# Patient Record
Sex: MALE | Race: OTHER | NOT HISPANIC OR LATINO | URBAN - METROPOLITAN AREA
[De-identification: names, ages, dates, MRNs, and addresses within clinical notes are randomized per-mention and may not be internally consistent; named-entity substitution may affect disease eponyms.]

---

## 2020-09-03 RX ORDER — INFLUENZA VIRUS VACCINE 15; 15; 15; 15 UG/.5ML; UG/.5ML; UG/.5ML; UG/.5ML
0.5 SUSPENSION INTRAMUSCULAR ONCE
Refills: 0 | Status: COMPLETED | OUTPATIENT
Start: 2020-09-04 | End: 2020-09-04

## 2020-09-04 ENCOUNTER — INPATIENT (INPATIENT)
Facility: HOSPITAL | Age: 50
LOS: 4 days | Discharge: HOME HEALTH | DRG: 30 | End: 2020-09-09
Attending: NEUROLOGICAL SURGERY | Admitting: NEUROLOGICAL SURGERY
Payer: COMMERCIAL

## 2020-09-04 VITALS
HEART RATE: 96 BPM | RESPIRATION RATE: 16 BRPM | SYSTOLIC BLOOD PRESSURE: 142 MMHG | TEMPERATURE: 97 F | WEIGHT: 174.17 LBS | DIASTOLIC BLOOD PRESSURE: 88 MMHG

## 2020-09-04 LAB
ANION GAP SERPL CALC-SCNC: 11 MMOL/L — SIGNIFICANT CHANGE UP (ref 5–17)
APTT BLD: 31.8 SEC — SIGNIFICANT CHANGE UP (ref 27.5–35.5)
BASOPHILS # BLD AUTO: 0.06 K/UL — SIGNIFICANT CHANGE UP (ref 0–0.2)
BASOPHILS NFR BLD AUTO: 0.9 % — SIGNIFICANT CHANGE UP (ref 0–2)
BUN SERPL-MCNC: 12 MG/DL — SIGNIFICANT CHANGE UP (ref 7–23)
CALCIUM SERPL-MCNC: 9.2 MG/DL — SIGNIFICANT CHANGE UP (ref 8.4–10.5)
CHLORIDE SERPL-SCNC: 104 MMOL/L — SIGNIFICANT CHANGE UP (ref 96–108)
CO2 SERPL-SCNC: 25 MMOL/L — SIGNIFICANT CHANGE UP (ref 22–31)
CREAT SERPL-MCNC: 0.66 MG/DL — SIGNIFICANT CHANGE UP (ref 0.5–1.3)
EOSINOPHIL # BLD AUTO: 0 K/UL — SIGNIFICANT CHANGE UP (ref 0–0.5)
EOSINOPHIL NFR BLD AUTO: 0 % — SIGNIFICANT CHANGE UP (ref 0–6)
GLUCOSE SERPL-MCNC: 158 MG/DL — HIGH (ref 70–99)
HCT VFR BLD CALC: 43.1 % — SIGNIFICANT CHANGE UP (ref 39–50)
HGB BLD-MCNC: 15 G/DL — SIGNIFICANT CHANGE UP (ref 13–17)
INR BLD: 1.07 — SIGNIFICANT CHANGE UP (ref 0.88–1.16)
LYMPHOCYTES # BLD AUTO: 0.37 K/UL — LOW (ref 1–3.3)
LYMPHOCYTES # BLD AUTO: 5.2 % — LOW (ref 13–44)
MCHC RBC-ENTMCNC: 28.8 PG — SIGNIFICANT CHANGE UP (ref 27–34)
MCHC RBC-ENTMCNC: 34.8 GM/DL — SIGNIFICANT CHANGE UP (ref 32–36)
MCV RBC AUTO: 82.7 FL — SIGNIFICANT CHANGE UP (ref 80–100)
MONOCYTES # BLD AUTO: 0.12 K/UL — SIGNIFICANT CHANGE UP (ref 0–0.9)
MONOCYTES NFR BLD AUTO: 1.7 % — LOW (ref 2–14)
NEUTROPHILS # BLD AUTO: 6.45 K/UL — SIGNIFICANT CHANGE UP (ref 1.8–7.4)
NEUTROPHILS NFR BLD AUTO: 91.3 % — HIGH (ref 43–77)
PLATELET # BLD AUTO: 267 K/UL — SIGNIFICANT CHANGE UP (ref 150–400)
POTASSIUM SERPL-MCNC: 3.8 MMOL/L — SIGNIFICANT CHANGE UP (ref 3.5–5.3)
POTASSIUM SERPL-SCNC: 3.8 MMOL/L — SIGNIFICANT CHANGE UP (ref 3.5–5.3)
PROTHROM AB SERPL-ACNC: 12.8 SEC — SIGNIFICANT CHANGE UP (ref 10.6–13.6)
RBC # BLD: 5.21 M/UL — SIGNIFICANT CHANGE UP (ref 4.2–5.8)
RBC # FLD: 12.6 % — SIGNIFICANT CHANGE UP (ref 10.3–14.5)
SODIUM SERPL-SCNC: 140 MMOL/L — SIGNIFICANT CHANGE UP (ref 135–145)
WBC # BLD: 7.07 K/UL — SIGNIFICANT CHANGE UP (ref 3.8–10.5)
WBC # FLD AUTO: 7.07 K/UL — SIGNIFICANT CHANGE UP (ref 3.8–10.5)

## 2020-09-04 PROCEDURE — 88304 TISSUE EXAM BY PATHOLOGIST: CPT | Mod: 26

## 2020-09-04 PROCEDURE — 88305 TISSUE EXAM BY PATHOLOGIST: CPT | Mod: 26

## 2020-09-04 RX ORDER — CHLORHEXIDINE GLUCONATE 213 G/1000ML
1 SOLUTION TOPICAL EVERY 12 HOURS
Refills: 0 | Status: DISCONTINUED | OUTPATIENT
Start: 2020-09-04 | End: 2020-09-04

## 2020-09-04 RX ORDER — DEXTROSE 50 % IN WATER 50 %
12.5 SYRINGE (ML) INTRAVENOUS ONCE
Refills: 0 | Status: DISCONTINUED | OUTPATIENT
Start: 2020-09-04 | End: 2020-09-09

## 2020-09-04 RX ORDER — HYDROMORPHONE HYDROCHLORIDE 2 MG/ML
0.5 INJECTION INTRAMUSCULAR; INTRAVENOUS; SUBCUTANEOUS
Refills: 0 | Status: DISCONTINUED | OUTPATIENT
Start: 2020-09-04 | End: 2020-09-07

## 2020-09-04 RX ORDER — OXYCODONE AND ACETAMINOPHEN 5; 325 MG/1; MG/1
2 TABLET ORAL EVERY 4 HOURS
Refills: 0 | Status: DISCONTINUED | OUTPATIENT
Start: 2020-09-04 | End: 2020-09-09

## 2020-09-04 RX ORDER — DEXTROSE 50 % IN WATER 50 %
25 SYRINGE (ML) INTRAVENOUS ONCE
Refills: 0 | Status: DISCONTINUED | OUTPATIENT
Start: 2020-09-04 | End: 2020-09-09

## 2020-09-04 RX ORDER — INSULIN LISPRO 100/ML
VIAL (ML) SUBCUTANEOUS
Refills: 0 | Status: DISCONTINUED | OUTPATIENT
Start: 2020-09-04 | End: 2020-09-05

## 2020-09-04 RX ORDER — OXYCODONE AND ACETAMINOPHEN 5; 325 MG/1; MG/1
1 TABLET ORAL EVERY 4 HOURS
Refills: 0 | Status: DISCONTINUED | OUTPATIENT
Start: 2020-09-04 | End: 2020-09-09

## 2020-09-04 RX ORDER — ONDANSETRON 8 MG/1
4 TABLET, FILM COATED ORAL EVERY 6 HOURS
Refills: 0 | Status: DISCONTINUED | OUTPATIENT
Start: 2020-09-04 | End: 2020-09-07

## 2020-09-04 RX ORDER — CEFAZOLIN SODIUM 1 G
2000 VIAL (EA) INJECTION EVERY 8 HOURS
Refills: 0 | Status: COMPLETED | OUTPATIENT
Start: 2020-09-04 | End: 2020-09-05

## 2020-09-04 RX ORDER — CYCLOBENZAPRINE HYDROCHLORIDE 10 MG/1
10 TABLET, FILM COATED ORAL EVERY 8 HOURS
Refills: 0 | Status: DISCONTINUED | OUTPATIENT
Start: 2020-09-04 | End: 2020-09-09

## 2020-09-04 RX ORDER — MAGNESIUM HYDROXIDE 400 MG/1
30 TABLET, CHEWABLE ORAL EVERY 12 HOURS
Refills: 0 | Status: DISCONTINUED | OUTPATIENT
Start: 2020-09-04 | End: 2020-09-09

## 2020-09-04 RX ORDER — SODIUM CHLORIDE 9 MG/ML
1000 INJECTION, SOLUTION INTRAVENOUS
Refills: 0 | Status: DISCONTINUED | OUTPATIENT
Start: 2020-09-04 | End: 2020-09-09

## 2020-09-04 RX ORDER — SODIUM CHLORIDE 9 MG/ML
1000 INJECTION INTRAMUSCULAR; INTRAVENOUS; SUBCUTANEOUS
Refills: 0 | Status: DISCONTINUED | OUTPATIENT
Start: 2020-09-04 | End: 2020-09-05

## 2020-09-04 RX ORDER — GLUCAGON INJECTION, SOLUTION 0.5 MG/.1ML
1 INJECTION, SOLUTION SUBCUTANEOUS ONCE
Refills: 0 | Status: DISCONTINUED | OUTPATIENT
Start: 2020-09-04 | End: 2020-09-09

## 2020-09-04 RX ORDER — BUPIVACAINE 13.3 MG/ML
20 INJECTION, SUSPENSION, LIPOSOMAL INFILTRATION ONCE
Refills: 0 | Status: DISCONTINUED | OUTPATIENT
Start: 2020-09-04 | End: 2020-09-09

## 2020-09-04 RX ORDER — DEXTROSE 50 % IN WATER 50 %
15 SYRINGE (ML) INTRAVENOUS ONCE
Refills: 0 | Status: DISCONTINUED | OUTPATIENT
Start: 2020-09-04 | End: 2020-09-09

## 2020-09-04 RX ORDER — SENNA PLUS 8.6 MG/1
2 TABLET ORAL AT BEDTIME
Refills: 0 | Status: DISCONTINUED | OUTPATIENT
Start: 2020-09-04 | End: 2020-09-09

## 2020-09-04 RX ORDER — POVIDONE-IODINE 5 %
1 AEROSOL (ML) TOPICAL ONCE
Refills: 0 | Status: COMPLETED | OUTPATIENT
Start: 2020-09-04 | End: 2020-09-04

## 2020-09-04 RX ORDER — FAMOTIDINE 10 MG/ML
20 INJECTION INTRAVENOUS DAILY
Refills: 0 | Status: DISCONTINUED | OUTPATIENT
Start: 2020-09-04 | End: 2020-09-05

## 2020-09-04 RX ORDER — ACETAMINOPHEN 500 MG
650 TABLET ORAL EVERY 6 HOURS
Refills: 0 | Status: DISCONTINUED | OUTPATIENT
Start: 2020-09-04 | End: 2020-09-09

## 2020-09-04 RX ADMIN — CHLORHEXIDINE GLUCONATE 1 APPLICATION(S): 213 SOLUTION TOPICAL at 07:40

## 2020-09-04 RX ADMIN — Medication 0: at 16:30

## 2020-09-04 RX ADMIN — CYCLOBENZAPRINE HYDROCHLORIDE 10 MILLIGRAM(S): 10 TABLET, FILM COATED ORAL at 16:07

## 2020-09-04 RX ADMIN — SENNA PLUS 2 TABLET(S): 8.6 TABLET ORAL at 22:25

## 2020-09-04 RX ADMIN — CYCLOBENZAPRINE HYDROCHLORIDE 10 MILLIGRAM(S): 10 TABLET, FILM COATED ORAL at 22:25

## 2020-09-04 RX ADMIN — SODIUM CHLORIDE 75 MILLILITER(S): 9 INJECTION INTRAMUSCULAR; INTRAVENOUS; SUBCUTANEOUS at 15:11

## 2020-09-04 RX ADMIN — Medication 1 APPLICATION(S): at 07:40

## 2020-09-04 RX ADMIN — Medication 100 MILLIGRAM(S): at 20:17

## 2020-09-04 RX ADMIN — FAMOTIDINE 20 MILLIGRAM(S): 10 INJECTION INTRAVENOUS at 15:12

## 2020-09-04 NOTE — H&P ADULT - NSHPPHYSICALEXAM_GEN_ALL_CORE
Constitutional: NAD, well groomed, well nourished  Respiratory: breathing non-labored, symmetrical chest wall movement  Cardiovascuar: RRR, no murmurs  Gastrointestinal: abdomen soft, non tender  Genitourinary: exam deffered  Neurological:  AAOX3. Verbal function intact  Cranial Nerves: II-XII intact  Motor: 5/5 power in b/l UE and LE  Sensation: intact to light touch in all extremities  (+) 15cm X 10CM fluid filled mass at base of spine

## 2020-09-04 NOTE — PROGRESS NOTE ADULT - SUBJECTIVE AND OBJECTIVE BOX
NEUROSURGERY POST OP NOTE:    POD# 0 S/P released tethered spinal cord meningomyelocele repair. No complications.    S: Pt is feeling well. Denies pain, numbness, weakness, tingling, headache, chest pain, palpitations, shortness of breath.      T(C): 36.4 (09-04-20 @ 13:07), Max: 36.4 (09-04-20 @ 13:07)  HR: 93 (09-04-20 @ 15:37) (85 - 104)  BP: 111/70 (09-04-20 @ 15:07) (107/67 - 142/88)  RR: 25 (09-04-20 @ 15:37) (16 - 25)  SpO2: 100% (09-04-20 @ 15:37) (99% - 100%)      09-04-20 @ 07:01  -  09-04-20 @ 16:04  --------------------------------------------------------  IN: 75 mL / OUT: 275 mL / NET: -200 mL        acetaminophen   Tablet .. 650 milliGRAM(s) Oral every 6 hours PRN  aluminum hydroxide/magnesium hydroxide/simethicone Suspension 30 milliLiter(s) Oral every 12 hours PRN  BUpivacaine liposome 1.3% Injectable (no eMAR) 20 milliLiter(s) Local Injection once  ceFAZolin   IVPB 2000 milliGRAM(s) IV Intermittent every 8 hours  cyclobenzaprine 10 milliGRAM(s) Oral every 8 hours  dextrose 40% Gel 15 Gram(s) Oral once PRN  dextrose 5%. 1000 milliLiter(s) IV Continuous <Continuous>  dextrose 50% Injectable 12.5 Gram(s) IV Push once  dextrose 50% Injectable 25 Gram(s) IV Push once  dextrose 50% Injectable 25 Gram(s) IV Push once  famotidine    Tablet 20 milliGRAM(s) Oral daily  glucagon  Injectable 1 milliGRAM(s) IntraMuscular once PRN  HYDROmorphone  Injectable 0.5 milliGRAM(s) IV Push every 3 hours PRN  influenza   Vaccine 0.5 milliLiter(s) IntraMuscular once  insulin lispro (HumaLOG) corrective regimen sliding scale   SubCutaneous Before meals and at bedtime  magnesium hydroxide Suspension 30 milliLiter(s) Oral every 12 hours PRN  ondansetron Injectable 4 milliGRAM(s) IV Push every 6 hours PRN  oxycodone    5 mG/acetaminophen 325 mG 1 Tablet(s) Oral every 4 hours PRN  oxycodone    5 mG/acetaminophen 325 mG 2 Tablet(s) Oral every 4 hours PRN  senna 2 Tablet(s) Oral at bedtime  sodium chloride 0.9%. 1000 milliLiter(s) IV Continuous <Continuous>      RADIOLOGY:     Exam:  Gen: AAOx3. NAD while laying in bed. Nasal cannula on 2L.  HEENT: Conjunctiva clear. Mucosa moist, uvula midline, no erythema or exudates.   Cardiac: Regular rate and rhythm. Clear S1 and S2. No murmurs, gallops, or rubs.  Pulm: Clear to auscultation b/l. No wheezing, rhonchi, or rales.  GI: +BS. Non-tender, non-distened.  Back: Midline incision C/D/I.  : Branham catheter in place.  Neuro:  CNII-XII intact. Speech clear, FC. Upper Exts: 5/5, Lower Exts: 5/5. Sensation intact throughout.   Negative Alcaraz sign.       WOUND/DRAINS:     DEVICES:       Assessment: 50y Male with HLD presents with life long lower back mass that has enlarged over the past few years. 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 He is now POD# 0 S/P released tethered spinal cord meningomyelocele repair.      Plan:  Neuro:  - Neuro checks q1h  - Vitals signs q1h  - Pain control  - Monitor output of branham  - ADAT  - ISS   - SCDs  - Post-op Ancef x 2   - Bowel regimen prn          Dispo:  D/w Dr. Sofia  PACU to ICU, full code.      Assessment:  Present when checked        []  GCS  E   V  M     Heart Failure: []Acute, [] acute on chronic , []chronic  Heart Failure:  [] Diastolic (HFpEF), [] Systolic (HFrEF), []Combined (HFpEF and HFrEF), [] RHF, [] Pulm HTN, [] Other    [] KRYSTEN, [] ATN, [] AIN, [] other  [] CKD1, [] CKD2, [] CKD 3, [] CKD 4, [] CKD 5, []ESRD    Encephalopathy: [] Metabolic, [] Hepatic, [] toxic, [] Neurological, [] Other    Abnormal Nurtitional Status: [] malnurtition (see nutrition note), [ ]underweight: BMI < 19, [] morbid obesity: BMI >40, [] Cachexia    [] Sepsis  [] hypovolemic shock,[] cardiogenic shock, [] hemorrhagic shock, [] neuogenic shock  [] Acute Respiratory Failure  []Cerebral edema, [] Brain compression/ herniation,   [] Functional quadriplegia  [] Acute blood loss anemia

## 2020-09-04 NOTE — H&P ADULT - HISTORY OF PRESENT ILLNESS
This is a 47 y/o male who has been evaluated by Dr. Sofia as an outpatient for an enlarging lumbar plan.  The patient has had this mass since he was a child.  He had normal development of motor/bowel/bladder function as a child.  He never sought medical/surgical treatment of this mass until recently because it has become very large. He has no numbness, weakness or tingling.  No pain in his legs, no HA/NECK PAIN/FEVERS/VOMITING.  This mass does not give him pain.  It enlarges through out the day as he is upright more.

## 2020-09-04 NOTE — H&P ADULT - ASSESSMENT
49 y/o male with life long lower back mass that has enlarged over the past few years.  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    1)  Consent signed by patient and attending in chart  2)  Pre-op medical clearance in chart  3)  Home meds: none  4)  Plan for ICU post op  5)  Mechanical DVT prophylaxis  6)  Discussed with Dr. Sofia

## 2020-09-04 NOTE — H&P ADULT - NSHPREVIEWOFSYSTEMS_GEN_ALL_CORE
REVIEW OF SYSTEMS      General:	no recent illnesses, no recent wt gain/loss    Skin/Breast:  intact  	  Ophthalmologic:  negative, glasses for ***  	  ENMT:	negative    Respiratory and Thorax: no coughing, wheezing, recent URI  	  Cardiovascular: no chest pain, OTERO    Gastrointestinal:	soft, non tender    Genitourinary: no frequency, dysuria    Musculoskeletal:	negative    Neurological:	see HPI    Psychiatric:	negative    Hematology/Lymphatics:	negative    Endocrine:  	negative    Allergic/Immunologic:  Negative

## 2020-09-04 NOTE — BRIEF OPERATIVE NOTE - NSICDXBRIEFPROCEDURE_GEN_ALL_CORE_FT
PROCEDURES:  Release, tethered spinal cord 04-Sep-2020 08:44:50 meningomyelocele repaire Alanis Olivera

## 2020-09-05 LAB
A1C WITH ESTIMATED AVERAGE GLUCOSE RESULT: 5.4 % — SIGNIFICANT CHANGE UP (ref 4–5.6)
ESTIMATED AVERAGE GLUCOSE: 108 MG/DL — SIGNIFICANT CHANGE UP (ref 68–114)

## 2020-09-05 PROCEDURE — 99024 POSTOP FOLLOW-UP VISIT: CPT

## 2020-09-05 RX ADMIN — CYCLOBENZAPRINE HYDROCHLORIDE 10 MILLIGRAM(S): 10 TABLET, FILM COATED ORAL at 05:49

## 2020-09-05 RX ADMIN — SENNA PLUS 2 TABLET(S): 8.6 TABLET ORAL at 21:55

## 2020-09-05 RX ADMIN — CYCLOBENZAPRINE HYDROCHLORIDE 10 MILLIGRAM(S): 10 TABLET, FILM COATED ORAL at 14:00

## 2020-09-05 RX ADMIN — Medication 100 MILLIGRAM(S): at 05:48

## 2020-09-05 RX ADMIN — CYCLOBENZAPRINE HYDROCHLORIDE 10 MILLIGRAM(S): 10 TABLET, FILM COATED ORAL at 21:55

## 2020-09-05 NOTE — PROGRESS NOTE ADULT - SUBJECTIVE AND OBJECTIVE BOX
HPI:  This is a 47 y/o male who has been evaluated by Dr. Sofia as an outpatient for an enlarging lumbar plan.  The patient has had this mass since he was a child.  He had normal development of motor/bowel/bladder function as a child.  He never sought medical/surgical treatment of this mass until recently because it has become very large. He has no numbness, weakness or tingling.  No pain in his legs, no HA/NECK PAIN/FEVERS/VOMITING.  This mass does not give him pain.  It enlarges through out the day as he is upright more. (04 Sep 2020 08:32)    OVERNIGHT EVENTS:  Vital Signs Last 24 Hrs  T(C): 37 (04 Sep 2020 22:00), Max: 37 (04 Sep 2020 22:00)  T(F): 98.6 (04 Sep 2020 22:00), Max: 98.6 (04 Sep 2020 22:00)  HR: 71 (04 Sep 2020 23:00) (68 - 104)  BP: 112/66 (04 Sep 2020 23:00) (105/68 - 142/88)  BP(mean): 83 (04 Sep 2020 23:00) (80 - 87)  RR: 25 (04 Sep 2020 23:00) (12 - 26)  SpO2: 99% (04 Sep 2020 23:00) (98% - 100%)    I&O's Detail    04 Sep 2020 07:01  -  05 Sep 2020 01:13  --------------------------------------------------------  IN:    Oral Fluid: 300 mL    sodium chloride 0.9%.: 675 mL  Total IN: 975 mL    OUT:    Indwelling Catheter - Urethral: 1250 mL  Total OUT: 1250 mL    Total NET: -275 mL        I&O's Summary    04 Sep 2020 07:01  -  05 Sep 2020 01:13  --------------------------------------------------------  IN: 975 mL / OUT: 1250 mL / NET: -275 mL        PHYSICAL EXAM:  Neurological:    Motor exam:         [] Upper extremity              Bi(c5)  WE(c6)  EE(c7)   FF(c8)                                                R         5/5        5/5        5/5       5/5                                               L          5/5        5/5        5/5       5/5         [] Lower extremeity          HF(l2)   KE(l3)    TA(l4)   EHL(l5)  GS(s1)                                                 R        5/5        5/5        5/5       5/5         5/5                                               L         5/5        5/5       5/5       5/5          5/5                                                        [] warm well perfused; capillary refill <3 seconds     Sensation: [] intact to light touch  [] decreased:       Cardiovascular:  Respiratory:  Gastrointestinal:  Genitourinary:  Extremities:  Incision/Wound:    TUBES/LINES:  [] CVC  [] A-line  [] Lumbar Drain  [] Ventriculostomy  [] Other    DIET:  [] NPO  [] Mechanical  [] Tube feeds    LABS:                        15.0   7.07  )-----------( 267      ( 04 Sep 2020 13:22 )             43.1     09-04    140  |  104  |  12  ----------------------------<  158<H>  3.8   |  25  |  0.66    Ca    9.2      04 Sep 2020 13:22      PT/INR - ( 04 Sep 2020 13:22 )   PT: 12.8 sec;   INR: 1.07          PTT - ( 04 Sep 2020 13:22 )  PTT:31.8 sec        CAPILLARY BLOOD GLUCOSE      POCT Blood Glucose.: 102 mg/dL (04 Sep 2020 22:59)  POCT Blood Glucose.: 126 mg/dL (04 Sep 2020 16:39)  POCT Blood Glucose.: 143 mg/dL (04 Sep 2020 13:17)      Drug Levels: [] N/A    CSF Analysis: [] N/A  Protein, CSF: 56 mg/dL (09-04 @ 10:22)  Glucose, CSF: 95 mg/dL (09-04 @ 10:22)  RBC Count - Spinal Fluid: 0 /uL (09-04 @ 10:22)      Allergies    No Known Allergies    Intolerances      MEDICATIONS:  Antibiotics:  ceFAZolin   IVPB 2000 milliGRAM(s) IV Intermittent every 8 hours    Neuro:  acetaminophen   Tablet .. 650 milliGRAM(s) Oral every 6 hours PRN  cyclobenzaprine 10 milliGRAM(s) Oral every 8 hours  HYDROmorphone  Injectable 0.5 milliGRAM(s) IV Push every 3 hours PRN  ondansetron Injectable 4 milliGRAM(s) IV Push every 6 hours PRN  oxycodone    5 mG/acetaminophen 325 mG 1 Tablet(s) Oral every 4 hours PRN  oxycodone    5 mG/acetaminophen 325 mG 2 Tablet(s) Oral every 4 hours PRN    Anticoagulation:    OTHER:  aluminum hydroxide/magnesium hydroxide/simethicone Suspension 30 milliLiter(s) Oral every 12 hours PRN  BUpivacaine liposome 1.3% Injectable (no eMAR) 20 milliLiter(s) Local Injection once  dextrose 40% Gel 15 Gram(s) Oral once PRN  dextrose 50% Injectable 12.5 Gram(s) IV Push once  dextrose 50% Injectable 25 Gram(s) IV Push once  dextrose 50% Injectable 25 Gram(s) IV Push once  famotidine    Tablet 20 milliGRAM(s) Oral daily  glucagon  Injectable 1 milliGRAM(s) IntraMuscular once PRN  influenza   Vaccine 0.5 milliLiter(s) IntraMuscular once  insulin lispro (HumaLOG) corrective regimen sliding scale   SubCutaneous Before meals and at bedtime  magnesium hydroxide Suspension 30 milliLiter(s) Oral every 12 hours PRN  senna 2 Tablet(s) Oral at bedtime    IVF:  dextrose 5%. 1000 milliLiter(s) IV Continuous <Continuous>  sodium chloride 0.9%. 1000 milliLiter(s) IV Continuous <Continuous>    CULTURES:    RADIOLOGY & ADDITIONAL TESTS:      ASSESSMENT:  50y Male s/p    PLAN:  NEURO:    CARDIOVASCULAR:    PULMONARY:    RENAL:    GI:    HEME:    ID:    ENDO:    DVT PROPHYLAXIS:  [] Venodynes                                [] Heparin/Lovenox    FALL RISK:  [] Low Risk                                    [] Impulsive    DISPOSITION: HPI:  This is a 49 y/o male who has been evaluated by Dr. Sofia as an outpatient for an enlarging lumbar plan.  The patient has had this mass since he was a child.  He had normal development of motor/bowel/bladder function as a child.  He never sought medical/surgical treatment of this mass until recently because it has become very large. He has no numbness, weakness or tingling.  No pain in his legs, no HA/NECK PAIN/FEVERS/VOMITING.  This mass does not give him pain.  It enlarges through out the day as he is upright more. (04 Sep 2020 08:32)    Hospital Course:  9/4: POD# 0 s/p tethered spinal cord release, meningomyelocele repair  9/5: POD #1, FARZANA overnight, neuro stable. Remained flat overnight, no leaking from surgical site.     Vital Signs Last 24 Hrs  T(C): 37 (04 Sep 2020 22:00), Max: 37 (04 Sep 2020 22:00)  T(F): 98.6 (04 Sep 2020 22:00), Max: 98.6 (04 Sep 2020 22:00)  HR: 71 (04 Sep 2020 23:00) (68 - 104)  BP: 112/66 (04 Sep 2020 23:00) (105/68 - 142/88)  BP(mean): 83 (04 Sep 2020 23:00) (80 - 87)  RR: 25 (04 Sep 2020 23:00) (12 - 26)  SpO2: 99% (04 Sep 2020 23:00) (98% - 100%)    I&O's Detail    04 Sep 2020 07:01  -  05 Sep 2020 01:13  --------------------------------------------------------  IN:    Oral Fluid: 300 mL    sodium chloride 0.9%.: 675 mL  Total IN: 975 mL    OUT:    Indwelling Catheter - Urethral: 1250 mL  Total OUT: 1250 mL    Total NET: -275 mL        I&O's Summary    04 Sep 2020 07:01  -  05 Sep 2020 01:13  --------------------------------------------------------  IN: 975 mL / OUT: 1250 mL / NET: -275 mL        PHYSICAL EXAM:  Gen: laying in hospital bed, NAD  Neuro: AA+Ox3, OE spont, FC   CN II-XII: PERRL, EOMI  Motor: MAEx4, 5/5 strength throughout  SILT in UE and LE b/l   lumbar site C/D/I     TUBES/LINES:  [] CVC  [] A-line  [] Lumbar Drain  [] Ventriculostomy  [] Other    DIET:  [] NPO  [x] Mechanical  [] Tube feeds    LABS:                        15.0   7.07  )-----------( 267      ( 04 Sep 2020 13:22 )             43.1     09-04    140  |  104  |  12  ----------------------------<  158<H>  3.8   |  25  |  0.66    Ca    9.2      04 Sep 2020 13:22      PT/INR - ( 04 Sep 2020 13:22 )   PT: 12.8 sec;   INR: 1.07          PTT - ( 04 Sep 2020 13:22 )  PTT:31.8 sec        CAPILLARY BLOOD GLUCOSE      POCT Blood Glucose.: 102 mg/dL (04 Sep 2020 22:59)  POCT Blood Glucose.: 126 mg/dL (04 Sep 2020 16:39)  POCT Blood Glucose.: 143 mg/dL (04 Sep 2020 13:17)      Drug Levels: [] N/A    CSF Analysis: [] N/A  Protein, CSF: 56 mg/dL (09-04 @ 10:22)  Glucose, CSF: 95 mg/dL (09-04 @ 10:22)  RBC Count - Spinal Fluid: 0 /uL (09-04 @ 10:22)      Allergies    No Known Allergies    Intolerances      MEDICATIONS:  Antibiotics:  ceFAZolin   IVPB 2000 milliGRAM(s) IV Intermittent every 8 hours    Neuro:  acetaminophen   Tablet .. 650 milliGRAM(s) Oral every 6 hours PRN  cyclobenzaprine 10 milliGRAM(s) Oral every 8 hours  HYDROmorphone  Injectable 0.5 milliGRAM(s) IV Push every 3 hours PRN  ondansetron Injectable 4 milliGRAM(s) IV Push every 6 hours PRN  oxycodone    5 mG/acetaminophen 325 mG 1 Tablet(s) Oral every 4 hours PRN  oxycodone    5 mG/acetaminophen 325 mG 2 Tablet(s) Oral every 4 hours PRN    Anticoagulation:    OTHER:  aluminum hydroxide/magnesium hydroxide/simethicone Suspension 30 milliLiter(s) Oral every 12 hours PRN  BUpivacaine liposome 1.3% Injectable (no eMAR) 20 milliLiter(s) Local Injection once  dextrose 40% Gel 15 Gram(s) Oral once PRN  dextrose 50% Injectable 12.5 Gram(s) IV Push once  dextrose 50% Injectable 25 Gram(s) IV Push once  dextrose 50% Injectable 25 Gram(s) IV Push once  famotidine    Tablet 20 milliGRAM(s) Oral daily  glucagon  Injectable 1 milliGRAM(s) IntraMuscular once PRN  influenza   Vaccine 0.5 milliLiter(s) IntraMuscular once  insulin lispro (HumaLOG) corrective regimen sliding scale   SubCutaneous Before meals and at bedtime  magnesium hydroxide Suspension 30 milliLiter(s) Oral every 12 hours PRN  senna 2 Tablet(s) Oral at bedtime    IVF:  dextrose 5%. 1000 milliLiter(s) IV Continuous <Continuous>  sodium chloride 0.9%. 1000 milliLiter(s) IV Continuous <Continuous>    CULTURES:    RADIOLOGY & ADDITIONAL TESTS:      ASSESSMENT:  50y Male with PMHx s/p release of tethered spinal cord, meningomyelocele repair (9/4/2020), POD #1    PLAN:  NEURO:  - neuro checks  - vitals checks  - pain control  - flat x4 days post op  - MRI L spine post op    CARDIOVASCULAR:  - normotensive SBP goal    PULMONARY:  - on room air, no issues    RENAL:  - NS @ 75  - branham in place    GI:  - ADAT   - bowel regimen    HEME:  - monitor H/H    ID:  - afebrile    ENDO:  - ISS    DVT PROPHYLAXIS:  [x] Venodynes                                [] Heparin/Lovenox    DISPOSITION: ICU status, full code, dispo pending    d/w Dr. Sofia and Dr. Anders    Assessment:  Present when checked    []  GCS  E   V  M     Heart Failure: []Acute, [] acute on chronic , []chronic  Heart Failure:  [] Diastolic (HFpEF), [] Systolic (HFrEF), []Combined (HFpEF and HFrEF), [] RHF, [] Pulm HTN, [] Other    [] KRYSTEN, [] ATN, [] AIN, [] other  [] CKD1, [] CKD2, [] CKD 3, [] CKD 4, [] CKD 5, []ESRD    Encephalopathy: [] Metabolic, [] Hepatic, [] toxic, [] Neurological, [] Other    Abnormal Nurtitional Status: [] malnurtition (see nutrition note), [ ]underweight: BMI < 19, [] morbid obesity: BMI >40, [] Cachexia    [] Sepsis  [] hypovolemic shock,[] cardiogenic shock, [] hemorrhagic shock, [] neuogenic shock  [] Acute Respiratory Failure  []Cerebral edema, [] Brain compression/ herniation,   [] Functional quadriplegia  [] Acute blood loss anemia HPI:  This is a 49 y/o male who has been evaluated by Dr. Sofia as an outpatient for an enlarging lumbar plan.  The patient has had this mass since he was a child.  He had normal development of motor/bowel/bladder function as a child.  He never sought medical/surgical treatment of this mass until recently because it has become very large. He has no numbness, weakness or tingling.  No pain in his legs, no HA/NECK PAIN/FEVERS/VOMITING.  This mass does not give him pain.  It enlarges through out the day as he is upright more. (04 Sep 2020 08:32)    Hospital Course:  9/4: POD# 0 s/p tethered spinal cord release, meningomyelocele repair  9/5: POD #1, FARZANA overnight, neuro stable. Remained flat overnight, no leaking from surgical site.     Vital Signs Last 24 Hrs  T(C): 37 (04 Sep 2020 22:00), Max: 37 (04 Sep 2020 22:00)  T(F): 98.6 (04 Sep 2020 22:00), Max: 98.6 (04 Sep 2020 22:00)  HR: 71 (04 Sep 2020 23:00) (68 - 104)  BP: 112/66 (04 Sep 2020 23:00) (105/68 - 142/88)  BP(mean): 83 (04 Sep 2020 23:00) (80 - 87)  RR: 25 (04 Sep 2020 23:00) (12 - 26)  SpO2: 99% (04 Sep 2020 23:00) (98% - 100%)    I&O's Detail    04 Sep 2020 07:01  -  05 Sep 2020 01:13  --------------------------------------------------------  IN:    Oral Fluid: 300 mL    sodium chloride 0.9%.: 675 mL  Total IN: 975 mL    OUT:    Indwelling Catheter - Urethral: 1250 mL  Total OUT: 1250 mL    Total NET: -275 mL        I&O's Summary    04 Sep 2020 07:01  -  05 Sep 2020 01:13  --------------------------------------------------------  IN: 975 mL / OUT: 1250 mL / NET: -275 mL        PHYSICAL EXAM:  Gen: laying in hospital bed, NAD  Neuro: AA+Ox3, OE spont, FC   CN II-XII: PERRL, EOMI  Motor: MAEx4, 5/5 strength throughout  SILT in UE and LE b/l   lumbar site C/D/I     TUBES/LINES:  [] CVC  [] A-line  [] Lumbar Drain  [] Ventriculostomy  [] Other    DIET:  [] NPO  [x] Mechanical  [] Tube feeds    LABS:                        15.0   7.07  )-----------( 267      ( 04 Sep 2020 13:22 )             43.1     09-04    140  |  104  |  12  ----------------------------<  158<H>  3.8   |  25  |  0.66    Ca    9.2      04 Sep 2020 13:22      PT/INR - ( 04 Sep 2020 13:22 )   PT: 12.8 sec;   INR: 1.07          PTT - ( 04 Sep 2020 13:22 )  PTT:31.8 sec        CAPILLARY BLOOD GLUCOSE      POCT Blood Glucose.: 102 mg/dL (04 Sep 2020 22:59)  POCT Blood Glucose.: 126 mg/dL (04 Sep 2020 16:39)  POCT Blood Glucose.: 143 mg/dL (04 Sep 2020 13:17)      Drug Levels: [] N/A    CSF Analysis: [] N/A  Protein, CSF: 56 mg/dL (09-04 @ 10:22)  Glucose, CSF: 95 mg/dL (09-04 @ 10:22)  RBC Count - Spinal Fluid: 0 /uL (09-04 @ 10:22)      Allergies    No Known Allergies    Intolerances      MEDICATIONS:  Antibiotics:  ceFAZolin   IVPB 2000 milliGRAM(s) IV Intermittent every 8 hours    Neuro:  acetaminophen   Tablet .. 650 milliGRAM(s) Oral every 6 hours PRN  cyclobenzaprine 10 milliGRAM(s) Oral every 8 hours  HYDROmorphone  Injectable 0.5 milliGRAM(s) IV Push every 3 hours PRN  ondansetron Injectable 4 milliGRAM(s) IV Push every 6 hours PRN  oxycodone    5 mG/acetaminophen 325 mG 1 Tablet(s) Oral every 4 hours PRN  oxycodone    5 mG/acetaminophen 325 mG 2 Tablet(s) Oral every 4 hours PRN    Anticoagulation:    OTHER:  aluminum hydroxide/magnesium hydroxide/simethicone Suspension 30 milliLiter(s) Oral every 12 hours PRN  BUpivacaine liposome 1.3% Injectable (no eMAR) 20 milliLiter(s) Local Injection once  dextrose 40% Gel 15 Gram(s) Oral once PRN  dextrose 50% Injectable 12.5 Gram(s) IV Push once  dextrose 50% Injectable 25 Gram(s) IV Push once  dextrose 50% Injectable 25 Gram(s) IV Push once  famotidine    Tablet 20 milliGRAM(s) Oral daily  glucagon  Injectable 1 milliGRAM(s) IntraMuscular once PRN  influenza   Vaccine 0.5 milliLiter(s) IntraMuscular once  insulin lispro (HumaLOG) corrective regimen sliding scale   SubCutaneous Before meals and at bedtime  magnesium hydroxide Suspension 30 milliLiter(s) Oral every 12 hours PRN  senna 2 Tablet(s) Oral at bedtime    IVF:  dextrose 5%. 1000 milliLiter(s) IV Continuous <Continuous>  sodium chloride 0.9%. 1000 milliLiter(s) IV Continuous <Continuous>    CULTURES:    RADIOLOGY & ADDITIONAL TESTS:      ASSESSMENT:  50y Male with PMHx tethered spinal cord s/p release of tethered spinal cord, meningomyelocele repair (9/4/2020), POD #1    PLAN:  NEURO:  - neuro checks  - vitals checks  - pain control  - flat x4 days post op  - MRI L spine post op    CARDIOVASCULAR:  - normotensive SBP goal    PULMONARY:  - on room air, no issues    RENAL:  - NS @ 75  - branham in place    GI:  - ADAT   - bowel regimen    HEME:  - monitor H/H    ID:  - afebrile    ENDO:  - ISS    DVT PROPHYLAXIS:  [x] Venodynes                                [] Heparin/Lovenox    DISPOSITION: ICU status, full code, dispo pending    d/w Dr. Sofia and Dr. Anders    Assessment:  Present when checked    []  GCS  E   V  M     Heart Failure: []Acute, [] acute on chronic , []chronic  Heart Failure:  [] Diastolic (HFpEF), [] Systolic (HFrEF), []Combined (HFpEF and HFrEF), [] RHF, [] Pulm HTN, [] Other    [] KRYSTEN, [] ATN, [] AIN, [] other  [] CKD1, [] CKD2, [] CKD 3, [] CKD 4, [] CKD 5, []ESRD    Encephalopathy: [] Metabolic, [] Hepatic, [] toxic, [] Neurological, [] Other    Abnormal Nurtitional Status: [] malnurtition (see nutrition note), [ ]underweight: BMI < 19, [] morbid obesity: BMI >40, [] Cachexia    [] Sepsis  [] hypovolemic shock,[] cardiogenic shock, [] hemorrhagic shock, [] neuogenic shock  [] Acute Respiratory Failure  []Cerebral edema, [] Brain compression/ herniation,   [] Functional quadriplegia  [] Acute blood loss anemia

## 2020-09-06 LAB
ALBUMIN SERPL ELPH-MCNC: 3.6 G/DL — SIGNIFICANT CHANGE UP (ref 3.3–5)
ALP SERPL-CCNC: 55 U/L — SIGNIFICANT CHANGE UP (ref 40–120)
ALT FLD-CCNC: 15 U/L — SIGNIFICANT CHANGE UP (ref 10–45)
ANION GAP SERPL CALC-SCNC: 10 MMOL/L — SIGNIFICANT CHANGE UP (ref 5–17)
AST SERPL-CCNC: 14 U/L — SIGNIFICANT CHANGE UP (ref 10–40)
BILIRUB SERPL-MCNC: 1.2 MG/DL — SIGNIFICANT CHANGE UP (ref 0.2–1.2)
BUN SERPL-MCNC: 10 MG/DL — SIGNIFICANT CHANGE UP (ref 7–23)
CALCIUM SERPL-MCNC: 9 MG/DL — SIGNIFICANT CHANGE UP (ref 8.4–10.5)
CHLORIDE SERPL-SCNC: 100 MMOL/L — SIGNIFICANT CHANGE UP (ref 96–108)
CO2 SERPL-SCNC: 28 MMOL/L — SIGNIFICANT CHANGE UP (ref 22–31)
CREAT SERPL-MCNC: 0.64 MG/DL — SIGNIFICANT CHANGE UP (ref 0.5–1.3)
GLUCOSE SERPL-MCNC: 105 MG/DL — HIGH (ref 70–99)
HCT VFR BLD CALC: 45 % — SIGNIFICANT CHANGE UP (ref 39–50)
HGB BLD-MCNC: 15.5 G/DL — SIGNIFICANT CHANGE UP (ref 13–17)
MAGNESIUM SERPL-MCNC: 1.8 MG/DL — SIGNIFICANT CHANGE UP (ref 1.6–2.6)
MCHC RBC-ENTMCNC: 28.4 PG — SIGNIFICANT CHANGE UP (ref 27–34)
MCHC RBC-ENTMCNC: 34.4 GM/DL — SIGNIFICANT CHANGE UP (ref 32–36)
MCV RBC AUTO: 82.4 FL — SIGNIFICANT CHANGE UP (ref 80–100)
NRBC # BLD: 0 /100 WBCS — SIGNIFICANT CHANGE UP (ref 0–0)
PHOSPHATE SERPL-MCNC: 3.2 MG/DL — SIGNIFICANT CHANGE UP (ref 2.5–4.5)
PLATELET # BLD AUTO: 266 K/UL — SIGNIFICANT CHANGE UP (ref 150–400)
POTASSIUM SERPL-MCNC: 3.4 MMOL/L — LOW (ref 3.5–5.3)
POTASSIUM SERPL-SCNC: 3.4 MMOL/L — LOW (ref 3.5–5.3)
PROT SERPL-MCNC: 6.3 G/DL — SIGNIFICANT CHANGE UP (ref 6–8.3)
RBC # BLD: 5.46 M/UL — SIGNIFICANT CHANGE UP (ref 4.2–5.8)
RBC # FLD: 12.6 % — SIGNIFICANT CHANGE UP (ref 10.3–14.5)
SODIUM SERPL-SCNC: 138 MMOL/L — SIGNIFICANT CHANGE UP (ref 135–145)
WBC # BLD: 9.4 K/UL — SIGNIFICANT CHANGE UP (ref 3.8–10.5)
WBC # FLD AUTO: 9.4 K/UL — SIGNIFICANT CHANGE UP (ref 3.8–10.5)

## 2020-09-06 PROCEDURE — 99024 POSTOP FOLLOW-UP VISIT: CPT

## 2020-09-06 PROCEDURE — 99232 SBSQ HOSP IP/OBS MODERATE 35: CPT

## 2020-09-06 RX ORDER — POTASSIUM CHLORIDE 20 MEQ
40 PACKET (EA) ORAL EVERY 4 HOURS
Refills: 0 | Status: COMPLETED | OUTPATIENT
Start: 2020-09-06 | End: 2020-09-07

## 2020-09-06 RX ORDER — MAGNESIUM SULFATE 500 MG/ML
2 VIAL (ML) INJECTION ONCE
Refills: 0 | Status: COMPLETED | OUTPATIENT
Start: 2020-09-06 | End: 2020-09-06

## 2020-09-06 RX ORDER — LANOLIN ALCOHOL/MO/W.PET/CERES
5 CREAM (GRAM) TOPICAL ONCE
Refills: 0 | Status: COMPLETED | OUTPATIENT
Start: 2020-09-06 | End: 2020-09-06

## 2020-09-06 RX ADMIN — Medication 40 MILLIEQUIVALENT(S): at 22:23

## 2020-09-06 RX ADMIN — Medication 50 GRAM(S): at 19:11

## 2020-09-06 RX ADMIN — CYCLOBENZAPRINE HYDROCHLORIDE 10 MILLIGRAM(S): 10 TABLET, FILM COATED ORAL at 15:00

## 2020-09-06 RX ADMIN — SENNA PLUS 2 TABLET(S): 8.6 TABLET ORAL at 22:23

## 2020-09-06 RX ADMIN — CYCLOBENZAPRINE HYDROCHLORIDE 10 MILLIGRAM(S): 10 TABLET, FILM COATED ORAL at 22:23

## 2020-09-06 RX ADMIN — Medication 5 MILLIGRAM(S): at 02:05

## 2020-09-06 RX ADMIN — CYCLOBENZAPRINE HYDROCHLORIDE 10 MILLIGRAM(S): 10 TABLET, FILM COATED ORAL at 06:31

## 2020-09-06 NOTE — PROGRESS NOTE ADULT - SUBJECTIVE AND OBJECTIVE BOX
HPI:  This is a 47 y/o male who has been evaluated by Dr. Sofia as an outpatient for an enlarging lumbar plan.  The patient has had this mass since he was a child.  He had normal development of motor/bowel/bladder function as a child.  He never sought medical/surgical treatment of this mass until recently because it has become very large. He has no numbness, weakness or tingling.  No pain in his legs, no HA/NECK PAIN/FEVERS/VOMITING.  This mass does not give him pain.  It enlarges through out the day as he is upright more. (04 Sep 2020 08:32)    Hospital Course:  9/4: POD# 0 s/p tethered spinal cord release, meningomyelocele repair  9/5: POD #1, FARZANA overnight, neuro stable. Remained flat overnight, no leaking from surgical site.   9/6: POD#2 Given melatonin for insomnia, Neuro exam stable. Pt remains flat. No signs of leakage. Pt denies headaches.    Vital Signs Last 24 Hrs  T(C): 36.9 (05 Sep 2020 21:59), Max: 36.9 (05 Sep 2020 14:33)  T(F): 98.5 (05 Sep 2020 21:59), Max: 98.5 (05 Sep 2020 21:59)  HR: 64 (06 Sep 2020 01:00) (60 - 70)  BP: 107/63 (06 Sep 2020 01:00) (94/61 - 117/73)  BP(mean): 80 (06 Sep 2020 01:00) (70 - 87)  RR: 16 (06 Sep 2020 01:00) (16 - 25)  SpO2: 97% (06 Sep 2020 01:00) (97% - 99%)    I&O's Summary    04 Sep 2020 07:01  -  05 Sep 2020 07:00  --------------------------------------------------------  IN: 1574 mL / OUT: 1550 mL / NET: 24 mL    05 Sep 2020 07:01  -  06 Sep 2020 03:33  --------------------------------------------------------  IN: 625 mL / OUT: 1860 mL / NET: -1235 mL      PHYSICAL EXAM:  Gen: laying in hospital bed, NAD  Neuro: AA+Ox3, OE spont, FC   CN II-XII: PERRL, EOMI  Motor: MAEx4, 5/5 strength throughout  SILT in UE and LE b/l   lumbar wound site C/D/I     TUBES/LINES:  [] CVC  [] A-line  [] Lumbar Drain  [] Ventriculostomy  [] Other    DIET:  [] NPO  [x] Mechanical  [] Tube feeds    LABS:                        15.0   7.07  )-----------( 267      ( 04 Sep 2020 13:22 )             43.1     09-04    140  |  104  |  12  ----------------------------<  158<H>  3.8   |  25  |  0.66    Ca    9.2      04 Sep 2020 13:22      PT/INR - ( 04 Sep 2020 13:22 )   PT: 12.8 sec;   INR: 1.07          PTT - ( 04 Sep 2020 13:22 )  PTT:31.8 sec        CAPILLARY BLOOD GLUCOSE      POCT Blood Glucose.: 115 mg/dL (05 Sep 2020 11:05)  POCT Blood Glucose.: 113 mg/dL (05 Sep 2020 06:54)      Drug Levels: [] N/A    CSF Analysis: [] N/A      Allergies    No Known Allergies    Intolerances      MEDICATIONS:  Antibiotics:    Neuro:  acetaminophen   Tablet .. 650 milliGRAM(s) Oral every 6 hours PRN  cyclobenzaprine 10 milliGRAM(s) Oral every 8 hours  HYDROmorphone  Injectable 0.5 milliGRAM(s) IV Push every 3 hours PRN  ondansetron Injectable 4 milliGRAM(s) IV Push every 6 hours PRN  oxycodone    5 mG/acetaminophen 325 mG 1 Tablet(s) Oral every 4 hours PRN  oxycodone    5 mG/acetaminophen 325 mG 2 Tablet(s) Oral every 4 hours PRN    Anticoagulation:    OTHER:  aluminum hydroxide/magnesium hydroxide/simethicone Suspension 30 milliLiter(s) Oral every 12 hours PRN  BUpivacaine liposome 1.3% Injectable (no eMAR) 20 milliLiter(s) Local Injection once  dextrose 40% Gel 15 Gram(s) Oral once PRN  dextrose 50% Injectable 12.5 Gram(s) IV Push once  dextrose 50% Injectable 25 Gram(s) IV Push once  dextrose 50% Injectable 25 Gram(s) IV Push once  glucagon  Injectable 1 milliGRAM(s) IntraMuscular once PRN  influenza   Vaccine 0.5 milliLiter(s) IntraMuscular once  magnesium hydroxide Suspension 30 milliLiter(s) Oral every 12 hours PRN  senna 2 Tablet(s) Oral at bedtime    IVF:  dextrose 5%. 1000 milliLiter(s) IV Continuous <Continuous>    CULTURES:  Culture Results:   No growth to date (09-04 @ 10:32)    RADIOLOGY & ADDITIONAL TESTS:      ASSESSMENT:  50y Male with PMHx tethered spinal cord s/p release of tethered spinal cord, meningomyelocele repair (9/4/2020), POD #2    D32.1  Handoff  MEWS Score  HLD (hyperlipidemia)  Tethered cord  Tethered cord  Release, tethered spinal cord  No significant past surgical history    PLAN:  NEURO:  - neuro checks  - vitals checks  - pain control  - flat x4 days post op, permissive HOB 5-10 degrees for eating  - MRI L spine post op    CARDIOVASCULAR:  - normotensive SBP goal    PULMONARY:  - on room air, no issues    RENAL:  - branham in place    GI:  - ADAT   - bowel regimen    HEME:  - monitor H/H    ID:  - afebrile    ENDO:  - ISS    DVT PROPHYLAXIS:  [x] Venodynes                                [] Heparin/Lovenox    DISPOSITION: Stepdown status, full code, dispo pending    d/w Dr. Sofia and Dr. Anders    Assessment:  Present when checked    []  GCS  E   V  M     Heart Failure: []Acute, [] acute on chronic , []chronic  Heart Failure:  [] Diastolic (HFpEF), [] Systolic (HFrEF), []Combined (HFpEF and HFrEF), [] RHF, [] Pulm HTN, [] Other    [] KRYSTEN, [] ATN, [] AIN, [] other  [] CKD1, [] CKD2, [] CKD 3, [] CKD 4, [] CKD 5, []ESRD    Encephalopathy: [] Metabolic, [] Hepatic, [] toxic, [] Neurological, [] Other    Abnormal Nurtitional Status: [] malnurtition (see nutrition note), [ ]underweight: BMI < 19, [] morbid obesity: BMI >40, [] Cachexia    [] Sepsis  [] hypovolemic shock,[] cardiogenic shock, [] hemorrhagic shock, [] neuogenic shock  [] Acute Respiratory Failure  []Cerebral edema, [] Brain compression/ herniation,   [] Functional quadriplegia  [] Acute blood loss anemia

## 2020-09-07 LAB
ANION GAP SERPL CALC-SCNC: 10 MMOL/L — SIGNIFICANT CHANGE UP (ref 5–17)
BUN SERPL-MCNC: 13 MG/DL — SIGNIFICANT CHANGE UP (ref 7–23)
CALCIUM SERPL-MCNC: 9.1 MG/DL — SIGNIFICANT CHANGE UP (ref 8.4–10.5)
CHLORIDE SERPL-SCNC: 100 MMOL/L — SIGNIFICANT CHANGE UP (ref 96–108)
CO2 SERPL-SCNC: 27 MMOL/L — SIGNIFICANT CHANGE UP (ref 22–31)
CREAT SERPL-MCNC: 0.72 MG/DL — SIGNIFICANT CHANGE UP (ref 0.5–1.3)
GLUCOSE SERPL-MCNC: 104 MG/DL — HIGH (ref 70–99)
HCT VFR BLD CALC: 48.5 % — SIGNIFICANT CHANGE UP (ref 39–50)
HGB BLD-MCNC: 16.3 G/DL — SIGNIFICANT CHANGE UP (ref 13–17)
MAGNESIUM SERPL-MCNC: 2.4 MG/DL — SIGNIFICANT CHANGE UP (ref 1.6–2.6)
MCHC RBC-ENTMCNC: 28.6 PG — SIGNIFICANT CHANGE UP (ref 27–34)
MCHC RBC-ENTMCNC: 33.6 GM/DL — SIGNIFICANT CHANGE UP (ref 32–36)
MCV RBC AUTO: 85.1 FL — SIGNIFICANT CHANGE UP (ref 80–100)
NRBC # BLD: 0 /100 WBCS — SIGNIFICANT CHANGE UP (ref 0–0)
PHOSPHATE SERPL-MCNC: 3.7 MG/DL — SIGNIFICANT CHANGE UP (ref 2.5–4.5)
PLATELET # BLD AUTO: 255 K/UL — SIGNIFICANT CHANGE UP (ref 150–400)
POTASSIUM SERPL-MCNC: 4.3 MMOL/L — SIGNIFICANT CHANGE UP (ref 3.5–5.3)
POTASSIUM SERPL-SCNC: 4.3 MMOL/L — SIGNIFICANT CHANGE UP (ref 3.5–5.3)
RBC # BLD: 5.7 M/UL — SIGNIFICANT CHANGE UP (ref 4.2–5.8)
RBC # FLD: 12.6 % — SIGNIFICANT CHANGE UP (ref 10.3–14.5)
SODIUM SERPL-SCNC: 137 MMOL/L — SIGNIFICANT CHANGE UP (ref 135–145)
WBC # BLD: 8.45 K/UL — SIGNIFICANT CHANGE UP (ref 3.8–10.5)
WBC # FLD AUTO: 8.45 K/UL — SIGNIFICANT CHANGE UP (ref 3.8–10.5)

## 2020-09-07 PROCEDURE — 99024 POSTOP FOLLOW-UP VISIT: CPT

## 2020-09-07 PROCEDURE — 72158 MRI LUMBAR SPINE W/O & W/DYE: CPT | Mod: 26

## 2020-09-07 RX ORDER — ENOXAPARIN SODIUM 100 MG/ML
40 INJECTION SUBCUTANEOUS
Refills: 0 | Status: DISCONTINUED | OUTPATIENT
Start: 2020-09-07 | End: 2020-09-09

## 2020-09-07 RX ADMIN — ENOXAPARIN SODIUM 40 MILLIGRAM(S): 100 INJECTION SUBCUTANEOUS at 21:28

## 2020-09-07 RX ADMIN — Medication 40 MILLIEQUIVALENT(S): at 02:25

## 2020-09-07 RX ADMIN — SENNA PLUS 2 TABLET(S): 8.6 TABLET ORAL at 21:28

## 2020-09-07 RX ADMIN — CYCLOBENZAPRINE HYDROCHLORIDE 10 MILLIGRAM(S): 10 TABLET, FILM COATED ORAL at 21:28

## 2020-09-07 RX ADMIN — CYCLOBENZAPRINE HYDROCHLORIDE 10 MILLIGRAM(S): 10 TABLET, FILM COATED ORAL at 06:34

## 2020-09-07 RX ADMIN — CYCLOBENZAPRINE HYDROCHLORIDE 10 MILLIGRAM(S): 10 TABLET, FILM COATED ORAL at 14:54

## 2020-09-07 NOTE — PROGRESS NOTE ADULT - SUBJECTIVE AND OBJECTIVE BOX
HPI:  This is a 49 y/o male who has been evaluated by Dr. Sofia as an outpatient for an enlarging lumbar plan.  The patient has had this mass since he was a child.  He had normal development of motor/bowel/bladder function as a child.  He never sought medical/surgical treatment of this mass until recently because it has become very large. He has no numbness, weakness or tingling.  No pain in his legs, no HA/NECK PAIN/FEVERS/VOMITING.  This mass does not give him pain.  It enlarges through out the day as he is upright more. (04 Sep 2020 08:32)    Hospital Course:  9/4: POD# 0 s/p tethered spinal cord release, meningomyelocele repair  9/5: POD #1, FARZANA overnight, neuro stable. Remained flat overnight, no leaking from surgical site.   9/6: POD#2 Given melatonin for insomnia, Neuro exam stable. Pt remains flat. No signs of leakage. Pt denies headaches.  9/7: POD #3 FARZANA overnight, neuro stable. Plan to start raising HOB up today. MRI L spine today.     Vital Signs Last 24 Hrs  T(C): 37.6 (07 Sep 2020 13:59), Max: 37.6 (07 Sep 2020 13:59)  T(F): 99.7 (07 Sep 2020 13:59), Max: 99.7 (07 Sep 2020 13:59)  HR: 98 (07 Sep 2020 12:45) (72 - 98)  BP: 123/76 (07 Sep 2020 12:45) (108/62 - 132/75)  BP(mean): 94 (07 Sep 2020 12:45) (80 - 95)  RR: 20 (07 Sep 2020 12:45) (12 - 20)  SpO2: 98% (07 Sep 2020 12:45) (96% - 99%)    I&O's Summary    06 Sep 2020 07:01  -  07 Sep 2020 07:00  --------------------------------------------------------  IN: 1010 mL / OUT: 1450 mL / NET: -440 mL    07 Sep 2020 07:01  -  07 Sep 2020 15:07  --------------------------------------------------------  IN: 0 mL / OUT: 475 mL / NET: -475 mL        PHYSICAL EXAM:  Gen: laying in hospital bed, NAD   Neuro: AA+Ox3, OE spont, FC  CN II-XII: PERRL, EOMI  Motor: MAEx4, 5/5 strength throughout  SILT in UE and LE b/l    TUBES/LINES:  [] Mirza  [] Lumbar Drain  [] Wound Drains  [] Others      DIET:  [] NPO  [x] Mechanical  [] Tube feeds    LABS:                        16.3   8.45  )-----------( 255      ( 07 Sep 2020 07:47 )             48.5     09-07    137  |  100  |  13  ----------------------------<  104<H>  4.3   |  27  |  0.72    Ca    9.1      07 Sep 2020 07:47  Phos  3.7     09-07  Mg     2.4     09-07    TPro  6.3  /  Alb  3.6  /  TBili  1.2  /  DBili  x   /  AST  14  /  ALT  15  /  AlkPhos  55  09-06            CAPILLARY BLOOD GLUCOSE          Drug Levels: [] N/A    CSF Analysis: [] N/A      Allergies    No Known Allergies    Intolerances      MEDICATIONS:  Antibiotics:    Neuro:  acetaminophen   Tablet .. 650 milliGRAM(s) Oral every 6 hours PRN  cyclobenzaprine 10 milliGRAM(s) Oral every 8 hours  oxycodone    5 mG/acetaminophen 325 mG 1 Tablet(s) Oral every 4 hours PRN  oxycodone    5 mG/acetaminophen 325 mG 2 Tablet(s) Oral every 4 hours PRN    Anticoagulation:    OTHER:  aluminum hydroxide/magnesium hydroxide/simethicone Suspension 30 milliLiter(s) Oral every 12 hours PRN  BUpivacaine liposome 1.3% Injectable (no eMAR) 20 milliLiter(s) Local Injection once  dextrose 40% Gel 15 Gram(s) Oral once PRN  dextrose 50% Injectable 12.5 Gram(s) IV Push once  dextrose 50% Injectable 25 Gram(s) IV Push once  dextrose 50% Injectable 25 Gram(s) IV Push once  glucagon  Injectable 1 milliGRAM(s) IntraMuscular once PRN  influenza   Vaccine 0.5 milliLiter(s) IntraMuscular once  magnesium hydroxide Suspension 30 milliLiter(s) Oral every 12 hours PRN  senna 2 Tablet(s) Oral at bedtime    IVF:  dextrose 5%. 1000 milliLiter(s) IV Continuous <Continuous>    CULTURES:  Culture Results:   No growth to date (09-04 @ 10:32)    RADIOLOGY & ADDITIONAL TESTS:      ASSESSMENT:  50y Male with PMHx tethered spinal cord s/p release of tethered spinal cord, meningomyelocele repair (9/4/2020), POD #3    PLAN:  - neuro checks  - vitals checks  - pain control  - plan to start raising HOB up today, OOB tomorrow   - MRI L spine today  - regular diet  - bowel regimen  - DVT PROPHYLAXIS: [x] Venodynes [x] Heparin/Lovenox    DISPOSITION: stepdown, full code, dispo pending    d/w Dr. Sofia    Assessment:  Present when checked    []  GCS  E   V  M     Heart Failure: []Acute, [] acute on chronic , []chronic  Heart Failure:  [] Diastolic (HFpEF), [] Systolic (HFrEF), []Combined (HFpEF and HFrEF), [] RHF, [] Pulm HTN, [] Other    [] KRYSTEN, [] ATN, [] AIN, [] other  [] CKD1, [] CKD2, [] CKD 3, [] CKD 4, [] CKD 5, []ESRD    Encephalopathy: [] Metabolic, [] Hepatic, [] toxic, [] Neurological, [] Other    Abnormal Nurtitional Status: [] malnurtition (see nutrition note), [ ]underweight: BMI < 19, [] morbid obesity: BMI >40, [] Cachexia    [] Sepsis  [] hypovolemic shock,[] cardiogenic shock, [] hemorrhagic shock, [] neuogenic shock  [] Acute Respiratory Failure  []Cerebral edema, [] Brain compression/ herniation,   [] Functional quadriplegia  [] Acute blood loss anemia

## 2020-09-08 PROCEDURE — 99232 SBSQ HOSP IP/OBS MODERATE 35: CPT

## 2020-09-08 PROCEDURE — 93970 EXTREMITY STUDY: CPT | Mod: 26

## 2020-09-08 RX ADMIN — ENOXAPARIN SODIUM 40 MILLIGRAM(S): 100 INJECTION SUBCUTANEOUS at 21:43

## 2020-09-08 RX ADMIN — CYCLOBENZAPRINE HYDROCHLORIDE 10 MILLIGRAM(S): 10 TABLET, FILM COATED ORAL at 05:42

## 2020-09-08 RX ADMIN — CYCLOBENZAPRINE HYDROCHLORIDE 10 MILLIGRAM(S): 10 TABLET, FILM COATED ORAL at 21:43

## 2020-09-08 RX ADMIN — SENNA PLUS 2 TABLET(S): 8.6 TABLET ORAL at 21:42

## 2020-09-08 RX ADMIN — CYCLOBENZAPRINE HYDROCHLORIDE 10 MILLIGRAM(S): 10 TABLET, FILM COATED ORAL at 13:02

## 2020-09-08 NOTE — PHYSICAL THERAPY INITIAL EVALUATION ADULT - COORDINATION ASSESSED, REHAB EVAL
Heel to shin: (L) LE mildly impaired coordination. (R) LE WNL./heel to shin heel to shin/Heel to shin: (L) LE mildly impaired coordination. (R) LE WNL.

## 2020-09-08 NOTE — OCCUPATIONAL THERAPY INITIAL EVALUATION ADULT - GENERAL OBSERVATIONS, REHAB EVAL
Spoke with RN prior. Cleared for OOB by DEMETRIS Barth. Pt received semi-supine in bed in NAD, +tele, +heplock.

## 2020-09-08 NOTE — PROGRESS NOTE ADULT - SUBJECTIVE AND OBJECTIVE BOX
S/Overnight events:  No events overnight     Hospital Course:  9/4: POD# 0 s/p tethered spinal cord release, meningomyelocele repair  9/5: POD #1, FARZANA overnight, neuro stable. Remained flat overnight, no leaking from surgical site.   9/6: POD#2 Given melatonin for insomnia, Neuro exam stable. Pt remains flat. No signs of leakage. Pt denies headaches.  9/7: POD #3 FARZANA overnight, neuro stable. Plan to start raising HOB up today. MRI L spine today.   9/8: POD 4 - No acute events overnight. Neuro stable     Vital Signs Last 24 Hrs  T(C): 36.9 (07 Sep 2020 22:49), Max: 37.6 (07 Sep 2020 13:59)  T(F): 98.5 (07 Sep 2020 22:49), Max: 99.7 (07 Sep 2020 13:59)  HR: 86 (07 Sep 2020 23:43) (84 - 106)  BP: 108/71 (07 Sep 2020 23:43) (108/71 - 132/75)  BP(mean): 85 (07 Sep 2020 23:43) (85 - 95)  RR: 18 (07 Sep 2020 23:43) (18 - 20)  SpO2: 98% (07 Sep 2020 23:43) (97% - 98%)    I&O's Detail    06 Sep 2020 07:01  -  07 Sep 2020 07:00  --------------------------------------------------------  IN:    IV PiggyBack: 50 mL    Oral Fluid: 960 mL  Total IN: 1010 mL    OUT:    Indwelling Catheter - Urethral: 350 mL    Intermittent Catheterization - Urethral: 450 mL    Voided: 650 mL  Total OUT: 1450 mL    Total NET: -440 mL      07 Sep 2020 07:01  -  08 Sep 2020 00:40  --------------------------------------------------------  IN:    Oral Fluid: 240 mL  Total IN: 240 mL    OUT:    Voided: 950 mL  Total OUT: 950 mL    Total NET: -710 mL        I&O's Summary    06 Sep 2020 07:01  -  07 Sep 2020 07:00  --------------------------------------------------------  IN: 1010 mL / OUT: 1450 mL / NET: -440 mL    07 Sep 2020 07:01  -  08 Sep 2020 00:40  --------------------------------------------------------  IN: 240 mL / OUT: 950 mL / NET: -710 mL        PHYSICAL EXAM:  Gen: Laying comfortably in bed. NAD  Neuro: AAOx3, Following commands, OE, Moving all extremities x4 with 5/5 strength on uppers and lowers     TUBES/LINES:  [] CVC  [] A-line  [] Lumbar Drain  [] Ventriculostomy  [] Other    DIET:  [] NPO  [] Mechanical  [] Tube feeds    LABS:                        16.3   8.45  )-----------( 255      ( 07 Sep 2020 07:47 )             48.5     09-07    137  |  100  |  13  ----------------------------<  104<H>  4.3   |  27  |  0.72    Ca    9.1      07 Sep 2020 07:47  Phos  3.7     09-07  Mg     2.4     09-07    TPro  6.3  /  Alb  3.6  /  TBili  1.2  /  DBili  x   /  AST  14  /  ALT  15  /  AlkPhos  55  09-06            CAPILLARY BLOOD GLUCOSE          Drug Levels: [] N/A    CSF Analysis: [] N/A      Allergies    No Known Allergies    Intolerances      MEDICATIONS:  Antibiotics:    Neuro:  acetaminophen   Tablet .. 650 milliGRAM(s) Oral every 6 hours PRN  cyclobenzaprine 10 milliGRAM(s) Oral every 8 hours  oxycodone    5 mG/acetaminophen 325 mG 1 Tablet(s) Oral every 4 hours PRN  oxycodone    5 mG/acetaminophen 325 mG 2 Tablet(s) Oral every 4 hours PRN    Anticoagulation:  enoxaparin Injectable 40 milliGRAM(s) SubCutaneous <User Schedule>    OTHER:  aluminum hydroxide/magnesium hydroxide/simethicone Suspension 30 milliLiter(s) Oral every 12 hours PRN  BUpivacaine liposome 1.3% Injectable (no eMAR) 20 milliLiter(s) Local Injection once  dextrose 40% Gel 15 Gram(s) Oral once PRN  dextrose 50% Injectable 12.5 Gram(s) IV Push once  dextrose 50% Injectable 25 Gram(s) IV Push once  dextrose 50% Injectable 25 Gram(s) IV Push once  glucagon  Injectable 1 milliGRAM(s) IntraMuscular once PRN  influenza   Vaccine 0.5 milliLiter(s) IntraMuscular once  magnesium hydroxide Suspension 30 milliLiter(s) Oral every 12 hours PRN  senna 2 Tablet(s) Oral at bedtime    IVF:  dextrose 5%. 1000 milliLiter(s) IV Continuous <Continuous>    CULTURES:  Culture Results:   No growth to date (09-04 @ 10:32)    RADIOLOGY & ADDITIONAL TESTS:      ASSESSMENT:  50y Male with PMHx tethered spinal cord s/p release of tethered spinal cord, meningomyelocele repair (9/4/2020), POD #4      D32.1  Handoff  MEWS Score  HLD (hyperlipidemia)  Tethered cord  Tethered cord  Release, tethered spinal cord  No significant past surgical history      PLAN:  Neuro and vital checks  Pain control as needed  Pt can begin to raise HOB and OOB 9/8  MRI L spine completed and stable   Regular diet/bowel regimen  DVT ppx with SQ Lovenox and SCDs      Plan d/w Dr. Quinones       Assessment:  Present when checked    []  GCS  E   V  M     Heart Failure: []Acute, [] acute on chronic , []chronic  Heart Failure:  [] Diastolic (HFpEF), [] Systolic (HFrEF), []Combined (HFpEF and HFrEF), [] RHF, [] Pulm HTN, [] Other    [] KRYSTEN, [] ATN, [] AIN, [] other  [] CKD1, [] CKD2, [] CKD 3, [] CKD 4, [] CKD 5, []ESRD    Encephalopathy: [] Metabolic, [] Hepatic, [] toxic, [] Neurological, [] Other    Abnormal Nurtitional Status: [] malnurtition (see nutrition note), [ ]underweight: BMI < 19, [] morbid obesity: BMI >40, [] Cachexia    [] Sepsis  [] hypovolemic shock,[] cardiogenic shock, [] hemorrhagic shock, [] neuogenic shock  [] Acute Respiratory Failure  []Cerebral edema, [] Brain compression/ herniation,   [] Functional quadriplegia  [] Acute blood loss anemia

## 2020-09-08 NOTE — PHYSICAL THERAPY INITIAL EVALUATION ADULT - GENERAL OBSERVATIONS, REHAB EVAL
Patient received supine in bed with + heplock IV, tele, room air. Aquacell dressing to lumbar spine C/D/I pre and post session.

## 2020-09-08 NOTE — PHYSICAL THERAPY INITIAL EVALUATION ADULT - CRITERIA FOR SKILLED THERAPEUTIC INTERVENTIONS
anticipated equipment needs at discharge/therapy frequency/anticipated discharge recommendation/impairments found/rehab potential

## 2020-09-08 NOTE — PHYSICAL THERAPY INITIAL EVALUATION ADULT - ADDITIONAL COMMENTS
Patient reports that he lives in a 4-flight walk-up apartment. Independent community ambulator at baseline.

## 2020-09-08 NOTE — CHART NOTE - NSCHARTNOTEFT_GEN_A_CORE
Neurosurgical Indications for Screening Dopplers on Admission:       1) Known hypercoagulation disorder (h/o VTE, thrombophilia, HIT, etc.)   2) Admitted from prolonged stay from another institution (straight forward ER transfers not included)  3) Presenting with significant leg immobility   4) Presenting with signs and symptoms of VTE?    5) With significant critical illness, Including "found down" for unknown period of time in HPI  6) With significant neurotrauma (TBI, SCI / TLS spine fractures)   7) Who are comatose   8) With known malignancy (e.g. glioblastoma multiforme, meningioma, etc.). Excludes IA chemo 23hr observation stays  9) On hemodialysis   10) Who have received platelet transfusion or antithrombotic reversal agents recently   11) Who have had recent major orthopedic surgery          Screening dopplers indicated?   Y _   N x_    DVT Prophylaxis:  x_ SCD's   _ chemoprophylaxis
Admitting Diagnosis:   Patient is a 50y old  Male who presents with a chief complaint of lumbar mass (08 Sep 2020 00:40)      PAST MEDICAL & SURGICAL HISTORY:  HLD (hyperlipidemia)  No significant past surgical history      Current Nutrition Order:  Regular     PO Intake: Good (%) [  x ]  Fair (50-75%) [   ] Poor (<25%) [   ]  Consumed cream of wheat, scrambled eggs, applesauce, and tea     GI Issues:   Denies N/V  Last BM 9/8  Ordered for simethicone, senna    Pain:  Pain being medically managed  Ordered for tylenol, oxycodone    Skin Integrity:  Surgical incision  Jhon score16    Labs:   09-07    137  |  100  |  13  ----------------------------<  104<H>  4.3   |  27  |  0.72    Ca    9.1      07 Sep 2020 07:47  Phos  3.7     09-07  Mg     2.4     09-07      CAPILLARY BLOOD GLUCOSE          Medications:  MEDICATIONS  (STANDING):  BUpivacaine liposome 1.3% Injectable (no eMAR) 20 milliLiter(s) Local Injection once  cyclobenzaprine 10 milliGRAM(s) Oral every 8 hours  dextrose 5%. 1000 milliLiter(s) (50 mL/Hr) IV Continuous <Continuous>  dextrose 50% Injectable 12.5 Gram(s) IV Push once  dextrose 50% Injectable 25 Gram(s) IV Push once  dextrose 50% Injectable 25 Gram(s) IV Push once  enoxaparin Injectable 40 milliGRAM(s) SubCutaneous <User Schedule>  influenza   Vaccine 0.5 milliLiter(s) IntraMuscular once  senna 2 Tablet(s) Oral at bedtime    MEDICATIONS  (PRN):  acetaminophen   Tablet .. 650 milliGRAM(s) Oral every 6 hours PRN Temp greater or equal to 38C (100.4F), Mild Pain (1 - 3)  aluminum hydroxide/magnesium hydroxide/simethicone Suspension 30 milliLiter(s) Oral every 12 hours PRN Indigestion  dextrose 40% Gel 15 Gram(s) Oral once PRN Blood Glucose LESS THAN 70 milliGRAM(s)/deciliter  glucagon  Injectable 1 milliGRAM(s) IntraMuscular once PRN Glucose LESS THAN 70 milligrams/deciliter  magnesium hydroxide Suspension 30 milliLiter(s) Oral every 12 hours PRN Constipation  oxycodone    5 mG/acetaminophen 325 mG 1 Tablet(s) Oral every 4 hours PRN Moderate Pain (4 - 6)  oxycodone    5 mG/acetaminophen 325 mG 2 Tablet(s) Oral every 4 hours PRN Severe Pain (7 - 10)    Admitted Anthropometrics:  Height: 5'7", IBW 148lbs+/-10%, %%, BMI 27    Weight: 174lbs (9/4)  Daily     Weight Change:    States UBW was ~182lbs about 2 years ago, current BW is 174lbs which he has maintained. Wt loss was intentional and done through healthy lifestyle choices. No malnutrition suspected.     Estimated energy needs:   ABW (79kg) used for calculations as pt between % of IBW.   Nutrient needs based on [facility] standards of care for maintenance in adults.   Needs adjusted for post-op healing  1977-2370kcal/day (25-30kcal/kg)  79-94g pro/day (1-1.2g pro/kg)  1977-2370ml fluid/day (25-30ml/kg)    Subjective:   49yo M with PMHx tethered spinal cord s/p release of tethered spinal cord, meningomyelocele repair (9/4/2020), POD #4. Pt seen in room, resting in bed. Currently on a regular diet and tolerating PO. Endorsing good appetite, consumed cream of wheat, scrambled eggs, applesauce, and tea. Denies N/V, last BM was today 9/8. At home pt adheres to a relatively healthy diet high in fruits, vegetables, lean meats, healthy fats. Does intermittent fasting (eats between 3pm and 10pm) however is eating breakfast while here. States UBW was ~182lbs about 2 years ago, current BW is 174lbs which he has maintained. NKFA or dietary restrictions. Skin: surgical incision; GI WDL per flowsheet. RD to follow.     Previous Nutrition Diagnosis:  increased nutrient needs RT increased demand for pro AEB s/p meningomyelocele repair   Active [x   ]  Resolved [   ]    If resolved, new PES:     Goal:  Pt to consistently meet % of estimated needs PO     Recommendations:  1. Continue on regular diet   2. Encourage protein options at meals  3. Obtain updated weights for accuracy and trending    Education:   Increased protein needs post-op    Risk Level: High [   ] Moderate [  x ] Low [   ]

## 2020-09-08 NOTE — PHYSICAL THERAPY INITIAL EVALUATION ADULT - MANUAL MUSCLE TESTING RESULTS, REHAB EVAL
(B) UE >3+/5 upon functional assessment against gravity. Formal MMT assessment for (B) LEs 5/5 including hip flexion, knee ext, knee flex, ankle DF, ankle PF, hallux ext

## 2020-09-08 NOTE — PHYSICAL THERAPY INITIAL EVALUATION ADULT - PERTINENT HX OF CURRENT PROBLEM, REHAB EVAL
50y Male with PMHx tethered spinal cord s/p release of tethered spinal cord, meningomyelocele repair (9/4/2020), POD #4

## 2020-09-08 NOTE — OCCUPATIONAL THERAPY INITIAL EVALUATION ADULT - PERTINENT HX OF CURRENT PROBLEM, REHAB EVAL
This is a 47 y/o male who has been evaluated by Dr. Sofia as an outpatient for an enlarging lumbar plan.  The patient has had this mass since he was a child.  He had normal development of motor/bowel/bladder function as a child.  He never sought medical/surgical treatment of this mass until recently because it has become very large.

## 2020-09-09 VITALS — TEMPERATURE: 98 F

## 2020-09-09 LAB
ANION GAP SERPL CALC-SCNC: 11 MMOL/L — SIGNIFICANT CHANGE UP (ref 5–17)
BUN SERPL-MCNC: 19 MG/DL — SIGNIFICANT CHANGE UP (ref 7–23)
CALCIUM SERPL-MCNC: 9.1 MG/DL — SIGNIFICANT CHANGE UP (ref 8.4–10.5)
CHLORIDE SERPL-SCNC: 103 MMOL/L — SIGNIFICANT CHANGE UP (ref 96–108)
CO2 SERPL-SCNC: 27 MMOL/L — SIGNIFICANT CHANGE UP (ref 22–31)
CREAT SERPL-MCNC: 0.72 MG/DL — SIGNIFICANT CHANGE UP (ref 0.5–1.3)
GLUCOSE SERPL-MCNC: 116 MG/DL — HIGH (ref 70–99)
HCT VFR BLD CALC: 46.3 % — SIGNIFICANT CHANGE UP (ref 39–50)
HGB BLD-MCNC: 15.7 G/DL — SIGNIFICANT CHANGE UP (ref 13–17)
MCHC RBC-ENTMCNC: 28.8 PG — SIGNIFICANT CHANGE UP (ref 27–34)
MCHC RBC-ENTMCNC: 33.9 GM/DL — SIGNIFICANT CHANGE UP (ref 32–36)
MCV RBC AUTO: 84.8 FL — SIGNIFICANT CHANGE UP (ref 80–100)
NRBC # BLD: 0 /100 WBCS — SIGNIFICANT CHANGE UP (ref 0–0)
PLATELET # BLD AUTO: 285 K/UL — SIGNIFICANT CHANGE UP (ref 150–400)
POTASSIUM SERPL-MCNC: 3.9 MMOL/L — SIGNIFICANT CHANGE UP (ref 3.5–5.3)
POTASSIUM SERPL-SCNC: 3.9 MMOL/L — SIGNIFICANT CHANGE UP (ref 3.5–5.3)
RBC # BLD: 5.46 M/UL — SIGNIFICANT CHANGE UP (ref 4.2–5.8)
RBC # FLD: 12.6 % — SIGNIFICANT CHANGE UP (ref 10.3–14.5)
SODIUM SERPL-SCNC: 141 MMOL/L — SIGNIFICANT CHANGE UP (ref 135–145)
WBC # BLD: 8.12 K/UL — SIGNIFICANT CHANGE UP (ref 3.8–10.5)
WBC # FLD AUTO: 8.12 K/UL — SIGNIFICANT CHANGE UP (ref 3.8–10.5)

## 2020-09-09 PROCEDURE — 87205 SMEAR GRAM STAIN: CPT

## 2020-09-09 PROCEDURE — 36415 COLL VENOUS BLD VENIPUNCTURE: CPT

## 2020-09-09 PROCEDURE — 97116 GAIT TRAINING THERAPY: CPT

## 2020-09-09 PROCEDURE — 85025 COMPLETE CBC W/AUTO DIFF WBC: CPT

## 2020-09-09 PROCEDURE — 85027 COMPLETE CBC AUTOMATED: CPT

## 2020-09-09 PROCEDURE — 97161 PT EVAL LOW COMPLEX 20 MIN: CPT

## 2020-09-09 PROCEDURE — 95940 IONM IN OPERATNG ROOM 15 MIN: CPT

## 2020-09-09 PROCEDURE — 97535 SELF CARE MNGMENT TRAINING: CPT

## 2020-09-09 PROCEDURE — 80053 COMPREHEN METABOLIC PANEL: CPT

## 2020-09-09 PROCEDURE — 86901 BLOOD TYPING SEROLOGIC RH(D): CPT

## 2020-09-09 PROCEDURE — 83735 ASSAY OF MAGNESIUM: CPT

## 2020-09-09 PROCEDURE — 85730 THROMBOPLASTIN TIME PARTIAL: CPT

## 2020-09-09 PROCEDURE — 72158 MRI LUMBAR SPINE W/O & W/DYE: CPT

## 2020-09-09 PROCEDURE — 87070 CULTURE OTHR SPECIMN AEROBIC: CPT

## 2020-09-09 PROCEDURE — 80048 BASIC METABOLIC PNL TOTAL CA: CPT

## 2020-09-09 PROCEDURE — 93970 EXTREMITY STUDY: CPT

## 2020-09-09 PROCEDURE — 89051 BODY FLUID CELL COUNT: CPT

## 2020-09-09 PROCEDURE — C1889: CPT

## 2020-09-09 PROCEDURE — 97162 PT EVAL MOD COMPLEX 30 MIN: CPT

## 2020-09-09 PROCEDURE — 82962 GLUCOSE BLOOD TEST: CPT

## 2020-09-09 PROCEDURE — 86850 RBC ANTIBODY SCREEN: CPT

## 2020-09-09 PROCEDURE — 84157 ASSAY OF PROTEIN OTHER: CPT

## 2020-09-09 PROCEDURE — 88304 TISSUE EXAM BY PATHOLOGIST: CPT

## 2020-09-09 PROCEDURE — 82945 GLUCOSE OTHER FLUID: CPT

## 2020-09-09 PROCEDURE — A9585: CPT

## 2020-09-09 PROCEDURE — 85610 PROTHROMBIN TIME: CPT

## 2020-09-09 PROCEDURE — 88305 TISSUE EXAM BY PATHOLOGIST: CPT

## 2020-09-09 PROCEDURE — 99024 POSTOP FOLLOW-UP VISIT: CPT

## 2020-09-09 PROCEDURE — 84100 ASSAY OF PHOSPHORUS: CPT

## 2020-09-09 PROCEDURE — 83036 HEMOGLOBIN GLYCOSYLATED A1C: CPT

## 2020-09-09 RX ORDER — SENNA PLUS 8.6 MG/1
2 TABLET ORAL
Qty: 14 | Refills: 0
Start: 2020-09-09 | End: 2020-09-15

## 2020-09-09 RX ORDER — CYCLOBENZAPRINE HYDROCHLORIDE 10 MG/1
1 TABLET, FILM COATED ORAL
Qty: 9 | Refills: 0
Start: 2020-09-09 | End: 2020-09-11

## 2020-09-09 RX ADMIN — CYCLOBENZAPRINE HYDROCHLORIDE 10 MILLIGRAM(S): 10 TABLET, FILM COATED ORAL at 14:08

## 2020-09-09 RX ADMIN — CYCLOBENZAPRINE HYDROCHLORIDE 10 MILLIGRAM(S): 10 TABLET, FILM COATED ORAL at 05:21

## 2020-09-09 NOTE — DISCHARGE NOTE PROVIDER - NSDCCPGOAL_GEN_ALL_CORE_FT
To get better and follow your care plan as instructed.    s/p Meningomyelocele repair with neuromonitoring.  (9/4/20)   1. Continue to take pain medications as instructed.   2. Walking with full weight bearing as tolerated, with assistive devices (walker/cane) as needed  3. Follow up as Outpatient in 1 week after discharge from the hospital. Call office immediately after discharge office for appointment.  5. Keep incision site clean and dry. May take shower, however, no bath/hot tubs or soaks  6. Call your doctor if you have any fever, new onset weakness, difficulty walking or any other concerns.

## 2020-09-09 NOTE — DISCHARGE NOTE PROVIDER - NSDCFUADDINST_GEN_ALL_CORE_FT
Keep your wound clean and dry.   Once a day, ask a family member to check your incision for signs of infection such as: Redness, Swelling and tenderness,  Drainage  • You may use stairs as tolerated.  • You may shower briefly, unless you told not to by your doctor.   Cover your entire incision with a waterproof bandage to make sure it does not get wet. If it gets wet, dry it off.   No tub baths or swimming for two weeks.   Take pain medicine as prescribed.   You may find it helpful to take it for morning stiffness or for soreness when trying to sleep.  o Pain medication can cause constipation. Eating food with fiber such as fruits and  vegetables, and drinking liquids may help prevent constipation.  • Avoid bending, twisting or heavy lifting.  • Do not sit for more than 20 minutes each time you sit.  • Do not wear pants that are tight on your incision.  Call you doctor immediately if you have:  • Any new numbness, tingling, or weakness in your arms and legs.  Worsening pain not responsive to pain meds, Fever of 101° F or more.  Follow up with Dr. Sofia in the office. Please call [phone] to make an appointment. Keep your wound clean and dry.  REMOVE AQUACEL DRESSING ON FRIDAY   Once a day, ask a family member to check your incision for signs of infection such as: Redness, Swelling and tenderness,  Drainage  • You may use stairs as tolerated.  • You may shower briefly   No tub baths or swimming for two weeks.   Take pain medicine as prescribed.   You may find it helpful to take it for morning stiffness or for soreness when trying to sleep.  o Pain medication can cause constipation. Eating food with fiber such as fruits and  vegetables, and drinking liquids may help prevent constipation.  • Avoid bending, twisting or heavy lifting.  • Do not sit for more than 20 minutes each time you sit.  • Do not wear pants that are tight on your incision.  Call you doctor immediately if you have:  • Any new numbness, tingling, or weakness in your arms and legs.  Worsening pain not responsive to pain meds, Fever of 101° F or more.    Follow up with Dr. Sofia in the office in 1 week. Please call [phone] to make an appointment.

## 2020-09-09 NOTE — PROGRESS NOTE ADULT - SUBJECTIVE AND OBJECTIVE BOX
HPI:  This is a 47 y/o male who has been evaluated by Dr. Sofia as an outpatient for an enlarging lumbar plan.  The patient has had this mass since he was a child.  He had normal development of motor/bowel/bladder function as a child.  He never sought medical/surgical treatment of this mass until recently because it has become very large. He has no numbness, weakness or tingling.  No pain in his legs, no HA/NECK PAIN/FEVERS/VOMITING.  This mass does not give him pain.  It enlarges through out the day as he is upright more. (04 Sep 2020 08:32)    Hospital Course:  9/4: POD# 0 s/p tethered spinal cord release, meningomyelocele repair  9/5: POD #1, FARZANA overnight, neuro stable. Remained flat overnight, no leaking from surgical site.   9/6: POD#2 Given melatonin for insomnia, Neuro exam stable. Pt remains flat. No signs of leakage. Pt denies headaches.  9/7: POD #3 FARZANA overnight, neuro stable. Plan to start raising HOB up today. MRI L spine today.   9/8: POD 4 - No acute events overnight. Neuro stable   9/9: POD 5 FARZANA overnight, home pending OOB with PT/OT today    Vital Signs Last 24 Hrs  T(C): 36.7 (09 Sep 2020 05:30), Max: 37.3 (08 Sep 2020 09:57)  T(F): 98 (09 Sep 2020 05:30), Max: 99.2 (08 Sep 2020 09:57)  HR: 76 (09 Sep 2020 04:00) (76 - 112)  BP: 107/65 (09 Sep 2020 04:00) (107/65 - 121/76)  BP(mean): 81 (09 Sep 2020 04:00) (81 - 93)  RR: 18 (09 Sep 2020 04:00) (17 - 18)  SpO2: 96% (09 Sep 2020 04:00) (96% - 98%)    I&O's Summary    08 Sep 2020 07:01  -  09 Sep 2020 07:00  --------------------------------------------------------  IN: 360 mL / OUT: 300 mL / NET: 60 mL        PHYSICAL EXAM:  General: patient seen laying supine in bed in NAD  Neuro: AAOx3, FC, OE spontaneously, speech clear and fluent, CNII-XI grossly intact, face symmetric, no pronator drift, strength 5/5 b/l UE and LE, SILT throughout  HEENT: PERRL, EOMI  Neck: supple  Cardiac: RRR, S1S2  Pulmonary: chest rise symmetric  Abdomen: soft, nontender, nondistended  Ext: warm, perfusing well        TUBES/LINES:  [] Mirza  [] Lumbar Drain  [] Wound Drains  [] Others      DIET:  [] NPO  [x] Mechanical  [] Tube feeds    LABS:                        15.7   8.12  )-----------( 285      ( 09 Sep 2020 06:24 )             46.3     09-09    141  |  103  |  19  ----------------------------<  116<H>  3.9   |  27  |  0.72    Ca    9.1      09 Sep 2020 06:24  Phos  3.7     09-07  Mg     2.4     09-07              CAPILLARY BLOOD GLUCOSE          Drug Levels: [] N/A    CSF Analysis: [] N/A      Allergies    No Known Allergies    Intolerances      MEDICATIONS:  Antibiotics:    Neuro:  acetaminophen   Tablet .. 650 milliGRAM(s) Oral every 6 hours PRN  cyclobenzaprine 10 milliGRAM(s) Oral every 8 hours  oxycodone    5 mG/acetaminophen 325 mG 1 Tablet(s) Oral every 4 hours PRN  oxycodone    5 mG/acetaminophen 325 mG 2 Tablet(s) Oral every 4 hours PRN    Anticoagulation:  enoxaparin Injectable 40 milliGRAM(s) SubCutaneous <User Schedule>    OTHER:  aluminum hydroxide/magnesium hydroxide/simethicone Suspension 30 milliLiter(s) Oral every 12 hours PRN  BUpivacaine liposome 1.3% Injectable (no eMAR) 20 milliLiter(s) Local Injection once  dextrose 40% Gel 15 Gram(s) Oral once PRN  dextrose 50% Injectable 12.5 Gram(s) IV Push once  dextrose 50% Injectable 25 Gram(s) IV Push once  dextrose 50% Injectable 25 Gram(s) IV Push once  glucagon  Injectable 1 milliGRAM(s) IntraMuscular once PRN  influenza   Vaccine 0.5 milliLiter(s) IntraMuscular once  magnesium hydroxide Suspension 30 milliLiter(s) Oral every 12 hours PRN  senna 2 Tablet(s) Oral at bedtime    IVF:  dextrose 5%. 1000 milliLiter(s) IV Continuous <Continuous>    CULTURES:  Culture Results:   No growth to date (09-04 @ 10:32)    RADIOLOGY & ADDITIONAL TESTS:      ASSESSMENT:  50y Male with PMHx tethered spinal cord s/p release of tethered spinal cord, meningomyelocele repair (9/4/2020), POD #5    D32.1  Handoff  MEWS Score  HLD (hyperlipidemia)  Tethered cord  Tethered cord  Release, tethered spinal cord  No significant past surgical history      PLAN:  Neuro and vital checks  Pain control as needed  OOB with PT/OT today - pending dispo home  MRI L spine completed and stable   Regular diet/bowel regimen  DVT ppx with SQ Lovenox and SCDs      Plan d/w Dr. Sofia    DVT PROPHYLAXIS:  [x] Venodynes                                [x] Heparin/Lovenox    DISPOSITION: regional status, full code,  dispo pending    Assessment:  Present when checked    []  GCS  E   V  M     Heart Failure: []Acute, [] acute on chronic , []chronic  Heart Failure:  [] Diastolic (HFpEF), [] Systolic (HFrEF), []Combined (HFpEF and HFrEF), [] RHF, [] Pulm HTN, [] Other    [] KRYSTEN, [] ATN, [] AIN, [] other  [] CKD1, [] CKD2, [] CKD 3, [] CKD 4, [] CKD 5, []ESRD    Encephalopathy: [] Metabolic, [] Hepatic, [] toxic, [] Neurological, [] Other    Abnormal Nurtitional Status: [] malnurtition (see nutrition note), [ ]underweight: BMI < 19, [] morbid obesity: BMI >40, [] Cachexia    [] Sepsis  [] hypovolemic shock,[] cardiogenic shock, [] hemorrhagic shock, [] neuogenic shock  [] Acute Respiratory Failure  []Cerebral edema, [] Brain compression/ herniation,   [] Functional quadriplegia  [] Acute blood loss anemia

## 2020-09-09 NOTE — DISCHARGE NOTE NURSING/CASE MANAGEMENT/SOCIAL WORK - PATIENT PORTAL LINK FT
You can access the FollowMyHealth Patient Portal offered by [facility] by registering at the following website: [email or web address]. By joining [employer]’s FollowMyHealth portal, you will also be able to view your health information using other applications (apps) compatible with our system.

## 2020-09-09 NOTE — DISCHARGE NOTE PROVIDER - NSDCMRMEDTOKEN_GEN_ALL_CORE_FT
cyclobenzaprine 10 mg oral tablet: 1 tab(s) orally every 8 hours, As Needed -for muscle spasm MDD:3 tabs   oxycodone-acetaminophen 5 mg-325 mg oral tablet: 1 tab(s) orally every 6 hours, As Needed -Moderate Pain (4 - 6) - for severe pain MDD:4 tabs   senna oral tablet: 2 tab(s) orally once a day (at bedtime)

## 2020-09-09 NOTE — DISCHARGE NOTE PROVIDER - CARE PROVIDER_API CALL
Joni Sofia  NEUROSURGERY  [address], [address] [place] [address]  Phone: [phone]  Fax: [phone]  Follow Up Time:

## 2020-09-09 NOTE — DISCHARGE NOTE PROVIDER - NSDCACTIVITY_GEN_ALL_CORE
Walking - Indoors allowed/Do not make important decisions/Stairs allowed/No heavy lifting/straining/Walking - Outdoors allowed/Showering allowed/Do not drive or operate machinery

## 2020-09-09 NOTE — DISCHARGE NOTE PROVIDER - HOSPITAL COURSE
HPI:    This is a 47 y/o male who has been evaluated by Dr. Sofia as an outpatient for an enlarging lumbar plan.  The patient has had this mass since he was a child.  He had normal development of motor/bowel/bladder function as a child.  He never sought medical/surgical treatment of this mass until recently because it has become very large. He has no numbness, weakness or tingling.  No pain in his legs, no HA/NECK PAIN/FEVERS/VOMITING.  This mass does not give him pain.  It enlarges through out the day as he is upright more. (04 Sep 2020 08:32)        Hospital Course:    9/4: POD# 0 s/p tethered spinal cord release, meningomyelocele repair    9/5: POD #1, FARZANA overnight, neuro stable. Remained flat overnight, no leaking from surgical site.     9/6: POD#2 Given melatonin for insomnia, Neuro exam stable. Pt remains flat. No signs of leakage. Pt denies headaches.    9/7: POD #3 FARZANA overnight, neuro stable. Plan to start raising HOB up today. MRI L spine today.     9/8: POD 4 - No acute events overnight. Neuro stable     9/9: POD 5 FARZANA overnight, home pending OOB with PT/OT today, MRI L spine stable . HPI:    This is a 47 y/o male who has been evaluated by Dr. Sofia as an outpatient for an enlarging lumbar plan.  The patient has had this mass since he was a child.  He had normal development of motor/bowel/bladder function as a child.  He never sought medical/surgical treatment of this mass until recently because it has become very large. He has no numbness, weakness or tingling.  No pain in his legs, no HA/NECK PAIN/FEVERS/VOMITING.  This mass does not give him pain.  It enlarges through out the day as he is upright more. (04 Sep 2020 08:32)        Hospital Course:    9/4: POD# 0 s/p tethered spinal cord release, meningomyelocele repair    9/5: POD #1, FARZANA overnight, neuro stable. Remained flat overnight, no leaking from surgical site.     9/6: POD#2 Given melatonin for insomnia, Neuro exam stable. Pt remains flat. No signs of leakage. Pt denies headaches.    9/7: POD #3 FARZANA overnight, neuro stable. Plan to start raising HOB up today. MRI L spine today.     9/8: POD 4 - No acute events overnight. Neuro stable     9/9: POD 5 FARZANA overnight, home pending OOB with PT/OT today, MRI L spine stable .         Patient evaluated by PT/OT who recommended home with PT/OT. Patient is neuro stable, pain well controlled, afebrile, ready for discharge to home today.

## 2020-09-09 NOTE — DISCHARGE NOTE PROVIDER - CARE PROVIDERS DIRECT ADDRESSES
[email or web address]

## 2020-09-09 NOTE — DISCHARGE NOTE PROVIDER - NSDCCPTREATMENT_GEN_ALL_CORE_FT
PRINCIPAL PROCEDURE  Procedure: Release, tethered spinal cord  Findings and Treatment: meningomyelocele repaire

## 2020-09-16 DIAGNOSIS — G47.00 INSOMNIA, UNSPECIFIED: ICD-10-CM

## 2020-09-16 DIAGNOSIS — Q05.7 LUMBAR SPINA BIFIDA WITHOUT HYDROCEPHALUS: ICD-10-CM

## 2020-09-16 DIAGNOSIS — Q06.8 OTHER SPECIFIED CONGENITAL MALFORMATIONS OF SPINAL CORD: ICD-10-CM

## 2020-09-16 DIAGNOSIS — E78.5 HYPERLIPIDEMIA, UNSPECIFIED: ICD-10-CM

## 2020-09-16 DIAGNOSIS — Q05.8 SACRAL SPINA BIFIDA WITHOUT HYDROCEPHALUS: ICD-10-CM

## 2021-06-24 NOTE — OCCUPATIONAL THERAPY INITIAL EVALUATION ADULT - LEVEL OF INDEPENDENCE: SCOOT/BRIDGE, REHAB EVAL
Bandar Hidalgo is a 65 year old male presenting with s/s of headache, cough, sinus congestion, bilateral  ear pain, with  post nasal drainage, and large amount of, thick and yellow nasal drainage with sore throat pain level of 9 due to coughing. Patient states he is wheezing, short of breath with activity and short of breath at rest. The chest feels tight. He has been fatigued and is sleep ing a lot. He has a fever of 102.7 with chills and sweats.he has bilat lower back flank pain. The urine has been orange brown color.Patient is taking cough syrup with codeine, cough drops, Aruna seltzer plus, and theraflu for the signs and symptoms.    Medication verified and med list updated  Denies known Latex allergy or symptoms of Latex sensitivity  Primary Care Physician verified        contact guard